# Patient Record
Sex: MALE | Race: WHITE | NOT HISPANIC OR LATINO | Employment: FULL TIME | URBAN - METROPOLITAN AREA
[De-identification: names, ages, dates, MRNs, and addresses within clinical notes are randomized per-mention and may not be internally consistent; named-entity substitution may affect disease eponyms.]

---

## 2024-01-17 ENCOUNTER — HOSPITAL ENCOUNTER (EMERGENCY)
Facility: CLINIC | Age: 59
Discharge: HOME | End: 2024-01-17
Attending: EMERGENCY MEDICINE | Admitting: EMERGENCY MEDICINE
Payer: COMMERCIAL

## 2024-01-17 VITALS
RESPIRATION RATE: 15 BRPM | DIASTOLIC BLOOD PRESSURE: 88 MMHG | SYSTOLIC BLOOD PRESSURE: 151 MMHG | HEIGHT: 70 IN | TEMPERATURE: 98.1 F | WEIGHT: 205 LBS | OXYGEN SATURATION: 98 % | HEART RATE: 81 BPM | BODY MASS INDEX: 29.35 KG/M2

## 2024-01-17 DIAGNOSIS — H10.33 ACUTE CONJUNCTIVITIS OF BOTH EYES, UNSPECIFIED ACUTE CONJUNCTIVITIS TYPE: ICD-10-CM

## 2024-01-17 PROCEDURE — 99283 EMERGENCY DEPT VISIT LOW MDM: CPT | Performed by: EMERGENCY MEDICINE

## 2024-01-17 RX ORDER — LOTEPREDNOL ETABONATE 5 MG/ML
1-2 SUSPENSION/ DROPS OPHTHALMIC 4 TIMES DAILY
Qty: 5 ML | Refills: 0 | Status: SHIPPED | OUTPATIENT
Start: 2024-01-17

## 2024-01-17 ASSESSMENT — ACTIVITIES OF DAILY LIVING (ADL): ADLS_ACUITY_SCORE: 35

## 2024-01-17 ASSESSMENT — VISUAL ACUITY: OU: 1

## 2024-01-17 NOTE — DISCHARGE INSTRUCTIONS
Use the eyedrops as prescribed.    If you are not improving or you note any worsening over the next 24 to 48 hours, please set up an appointment to be seen by any local optometrist.      Return for significant concerns.    I hope that you start to improve very quickly!!

## 2024-01-17 NOTE — ED TRIAGE NOTES
Complains of bilateral eyes feeling itchy, dry, blood shot and having drainage for the past 3 days  Denies vision changes

## 2024-01-17 NOTE — ED PROVIDER NOTES
"  History     Chief Complaint   Patient presents with    Eye Problem     HPI  History per patient, medical records    This is a basically healthy 58-year-old male presenting problem.  Patient has had itchy, red, crusting, dry yandel feeling on eyes for the last 3 days.  He does not wear contacts.  He has not had any vision changes.  No fevers or chills.  He has a little bit of nasal drainage.  No significant purulent discharge from the eyes.  No known exposures or foreign bodies.    Allergies:  No Known Allergies    Problem List:    Patient Active Problem List    Diagnosis Date Noted    Hyperlipidemia LDL goal <130 10/27/2011     Priority: Medium    CARDIOVASCULAR SCREENING; LDL GOAL LESS THAN 160 03/31/2011     Priority: Medium        Past Medical History:    Past Medical History:   Diagnosis Date    Mixed hyperlipidemia        Past Surgical History:    No past surgical history on file.    Family History:    No family history on file.    Social History:  Marital Status:   [4]  Social History     Tobacco Use    Smoking status: Every Day     Packs/day: 1.5     Types: Cigarettes    Smokeless tobacco: Never    Tobacco comments:     1.5-2.0 PPD        Medications:    loteprednol (LOTEMAX) 0.5 % ophthalmic suspension  CIALIS 20 MG tablet  simvastatin (ZOCOR) 40 MG tablet  simvastatin (ZOCOR) 40 MG tablet  tadalafil (CIALIS) 20 MG tablet          Review of Systems  All other ROS reviewed and are negative or non-contributory except as stated in HPI.     Physical Exam   BP: (!) 154/91  Pulse: 80  Temp: 98.1  F (36.7  C)  Resp: 15  Height: 177.8 cm (5' 10\")  Weight: 93 kg (205 lb)  SpO2: 98 %      Physical Exam  Vitals and nursing note reviewed.   Constitutional:       Appearance: Normal appearance.      Comments: Healthy-appearing male sitting in a chair   HENT:      Head: Normocephalic.      Nose: Rhinorrhea (Mild) present.      Mouth/Throat:      Mouth: Mucous membranes are moist.   Eyes:      General: Vision " grossly intact. Gaze aligned appropriately.      Extraocular Movements: Extraocular movements intact.      Conjunctiva/sclera:      Right eye: Right conjunctiva is injected.      Left eye: Left conjunctiva is injected.      Pupils: Pupils are equal, round, and reactive to light.      Funduscopic exam:     Right eye: No hemorrhage or exudate.         Left eye: No hemorrhage or exudate.      Comments: Small amount of crusting on the lashes   Neurological:      Mental Status: He is alert.         ED Course (with Medical Decision Making)    Pt seen and examined by me.  RN and EPIC notes reviewed.         Patient with conjunctival injection, crusting, itching.  I do not think he has evidence for bacterial infection.  I am going to treat him with very low-dose topical steroids.  Rx for Lotemax sent to pharmacy.  Recommend using until clear.  If he has any worsening follow-up with optometrist.  Return for concerns.      Procedures    No results found for this or any previous visit (from the past 24 hour(s)).    Medications - No data to display    Assessments & Plan     I have reviewed the findings, diagnosis, plan and need for follow up with the patient.  New Prescriptions    LOTEPREDNOL (LOTEMAX) 0.5 % OPHTHALMIC SUSPENSION    Place 1-2 drops into both eyes 4 times daily Until clear       Final diagnoses:   Acute conjunctivitis of both eyes, unspecified acute conjunctivitis type     Disposition: Patient discharged home in stable condition.  Plan as above.  Return for concerns.     Note: Chart documentation done in part with Dragon Voice Recognition software. Although reviewed after completion, some word and grammatical errors may remain.   1/17/2024   [facility] EMERGENCY DEPT       Vaishali Thurman MD  01/17/24 [id number]